# Patient Record
Sex: MALE | URBAN - METROPOLITAN AREA
[De-identification: names, ages, dates, MRNs, and addresses within clinical notes are randomized per-mention and may not be internally consistent; named-entity substitution may affect disease eponyms.]

---

## 2021-04-27 PROBLEM — Z00.00 ENCOUNTER FOR PREVENTIVE HEALTH EXAMINATION: Status: ACTIVE | Noted: 2021-04-27

## 2021-05-18 ENCOUNTER — OUTPATIENT (OUTPATIENT)
Dept: OUTPATIENT SERVICES | Facility: HOSPITAL | Age: 76
LOS: 1 days | End: 2021-05-18
Payer: MEDICARE

## 2021-05-18 ENCOUNTER — APPOINTMENT (OUTPATIENT)
Dept: ORTHOPEDIC SURGERY | Facility: CLINIC | Age: 76
End: 2021-05-18
Payer: MEDICARE

## 2021-05-18 ENCOUNTER — RESULT REVIEW (OUTPATIENT)
Age: 76
End: 2021-05-18

## 2021-05-18 VITALS
OXYGEN SATURATION: 96 % | WEIGHT: 185 LBS | HEART RATE: 88 BPM | SYSTOLIC BLOOD PRESSURE: 120 MMHG | TEMPERATURE: 98.3 F | DIASTOLIC BLOOD PRESSURE: 82 MMHG | HEIGHT: 72 IN | BODY MASS INDEX: 25.06 KG/M2

## 2021-05-18 DIAGNOSIS — Z82.49 FAMILY HISTORY OF ISCHEMIC HEART DISEASE AND OTHER DISEASES OF THE CIRCULATORY SYSTEM: ICD-10-CM

## 2021-05-18 DIAGNOSIS — Z86.79 PERSONAL HISTORY OF OTHER DISEASES OF THE CIRCULATORY SYSTEM: ICD-10-CM

## 2021-05-18 DIAGNOSIS — Z78.9 OTHER SPECIFIED HEALTH STATUS: ICD-10-CM

## 2021-05-18 DIAGNOSIS — Z87.39 PERSONAL HISTORY OF OTHER DISEASES OF THE MUSCULOSKELETAL SYSTEM AND CONNECTIVE TISSUE: ICD-10-CM

## 2021-05-18 DIAGNOSIS — M54.41 LUMBAGO WITH SCIATICA, RIGHT SIDE: ICD-10-CM

## 2021-05-18 DIAGNOSIS — Z99.2 END STAGE RENAL DISEASE: ICD-10-CM

## 2021-05-18 DIAGNOSIS — G89.29 LUMBAGO WITH SCIATICA, RIGHT SIDE: ICD-10-CM

## 2021-05-18 DIAGNOSIS — M41.9 SCOLIOSIS, UNSPECIFIED: ICD-10-CM

## 2021-05-18 DIAGNOSIS — N18.6 END STAGE RENAL DISEASE: ICD-10-CM

## 2021-05-18 PROCEDURE — 72082 X-RAY EXAM ENTIRE SPI 2/3 VW: CPT

## 2021-05-18 PROCEDURE — 72100 X-RAY EXAM L-S SPINE 2/3 VWS: CPT

## 2021-05-18 PROCEDURE — 72082 X-RAY EXAM ENTIRE SPI 2/3 VW: CPT | Mod: 26

## 2021-05-18 PROCEDURE — 72084 X-RAY EXAM ENTIRE SPI 6/> VW: CPT

## 2021-05-18 PROCEDURE — 99204 OFFICE O/P NEW MOD 45 MIN: CPT

## 2021-08-05 PROBLEM — M41.9 KYPHOSCOLIOSIS: Status: ACTIVE | Noted: 2021-08-05

## 2021-08-05 PROBLEM — Z87.39 HISTORY OF ARTHRITIS: Status: RESOLVED | Noted: 2021-08-05 | Resolved: 2021-08-05

## 2021-08-05 PROBLEM — Z78.9 NON-SMOKER: Status: ACTIVE | Noted: 2021-08-05

## 2021-08-05 PROBLEM — Z86.79 HISTORY OF HYPERTENSION: Status: RESOLVED | Noted: 2021-08-05 | Resolved: 2021-08-05

## 2021-08-05 PROBLEM — Z82.49 FAMILY HISTORY OF CARDIAC DISORDER: Status: ACTIVE | Noted: 2021-08-05

## 2021-08-05 PROBLEM — N18.6 CKD (CHRONIC KIDNEY DISEASE) REQUIRING CHRONIC DIALYSIS: Status: RESOLVED | Noted: 2021-08-05 | Resolved: 2021-08-05

## 2021-08-05 PROBLEM — Z78.9 EXERCISES RARELY: Status: ACTIVE | Noted: 2021-08-05

## 2021-08-05 RX ORDER — AMLODIPINE BESYLATE 5 MG/1
TABLET ORAL
Refills: 0 | Status: ACTIVE | COMMUNITY

## 2021-08-06 NOTE — PHYSICAL EXAM
[de-identified] : General: patient is well developed, well nourished, in no acute \par distress, alert and oriented x 3. \par \par Mood and affect: normal\par \par Respiratory: no respiratory distress noted\par \par Skin: no scars over spine, skin intact, no erythema, increased warmth\par \par Alignment: forward sagittal alignment\par \par Gait: The patient is able to toe walk and heel walk without difficulty. \par \par Palpation: no tenderness to palpation spine or paraspinal region\par \par Range of motion: Lumbar spine ROM is restricted\par \par Neurologic Exam:\par Motor: Manual Muscle testing in the lower extremities is 5 out of 5 in all muscle groups. There is no evidence of muscular atrophy in the lower extremities. Sensory: Sensation to light touch is grossly intact in the lower extremities\par \par Reflexes: DTR are present and symmetric throughout\par \par Hip Exam: No pain with internal or external rotation of hips bilaterally\par \par Special tests: Straight leg raise test negative.  Cross straight leg test negative.  ALONSO test negative\par \par Vascular: Examination of the peripheral vascular system demonstrates no evidence of congestion or edema. no evidence of lymphedema bilateral lower extremities, pulses are present and symmetric in both lower extremities.\par \par  [de-identified] : XR 5/2021: multilevel pronounced degenerative changes with bridging osteophytes, exaggerated thoracic kyphosis, no fractures seen

## 2021-08-06 NOTE — HISTORY OF PRESENT ILLNESS
[de-identified] : Mr. RUIZ is a very pleasant 76 year old male who complains of low back pain that radiates posteriorly down his right lower extremity for the past 6 months, atraumatic. Has been in physical therapy over the past month with mild improvement of symptoms.\par \par Patient on dialysis.\par \par The patient no history of previous spine surgery.\par \par The patient has no history of unexpected weight loss, no history of active cancer, no history bladder or bowel dysfunction, no night pain, no fevers or chills.\par \par The past medical history, surgical history, family history, allergies, medications, 10+ point review of systems, family history and social history were reviewed and non contributory.\par \par

## 2021-08-06 NOTE — DISCUSSION/SUMMARY
[de-identified] : Discussed my findings with the patient. I am recommending a course of physical therapy to focus on lumbar spine, posture/gait, and hip extension. The patient will follow up with me after physical therapy course if still having pain, sooner if there is an issue. All questions answered.